# Patient Record
(demographics unavailable — no encounter records)

---

## 2024-11-15 NOTE — DISCUSSION/SUMMARY
[de-identified] : modify activities use elastic sleeve for structural support try OTC meds ice as needed try topical lidocaine for pain control reviewed current medications used by this patient home exercises for functional return  RE:  LUCY DELGADO   [id number]   Attention:  Nurse Reviewer /Medical Director    Based on my patient's condition, I strongly believe that the MRI L knee is medically.necessary.   The patient has failed oral meds, injections and PT and conservative treatment in combination or by themselves and therefore needs the MRI.   The MRI will dictate further treatment t recommendations.

## 2024-11-15 NOTE — HISTORY OF PRESENT ILLNESS
[Gradual] : gradual [Radiating] : radiating [Work] : work [Standing] : standing [Walking] : walking [Not working due to injury] : Work status: not working due to injury [de-identified] : no injury, happened for 3 days and no swelling, uses OTCs and volt gel without much help, pain worse with certain motions, happened after walking, no prior issues [] : no [FreeTextEntry1] : l knee [FreeTextEntry5] : knee pain started 11-12-24 and noticed knee and hip pain , no injury  [FreeTextEntry7] : up to the hip [de-identified] : xr at the [facility]

## 2024-11-15 NOTE — PHYSICAL EXAM
[5___] : quadriceps 5[unfilled]/5 [Positive] : positive Imelda [] : antalgic [Left] : left knee [AP] : anteroposterior [Lateral] : lateral [There are no fractures, subluxations or dislocations. No significant abnormalities are seen] : There are no fractures, subluxations or dislocations. No significant abnormalities are seen [FreeTextEntry9] : ossicles by TT [TWNoteComboBox7] : flexion 130 degrees

## 2024-11-18 NOTE — HISTORY OF PRESENT ILLNESS
[7] : 7 [2] : 2 [Dull/Aching] : dull/aching [Sharp] : sharp [Intermittent] : intermittent [Leisure] : leisure [Rest] : rest [Meds] : meds [Standing] : standing [Walking] : walking [Stairs] : stairs [de-identified] : happened for 6 days and no swelling, uses OTCs and volt gel without much help, pain worse with certain motions, happened after walking,had MRI [Gradual] : gradual [Radiating] : radiating [Work] : work [Not working due to injury] : Work status: not working due to injury [] : no [FreeTextEntry1] : l knee [FreeTextEntry5] : knee pain started 11-12-24 and noticed knee and hip pain , no injury  [FreeTextEntry7] : up to the hip [FreeTextEntry9] : Topical Creams/ Brace [de-identified] : Dr. Tyler [de-identified] : xr at the [facility]

## 2024-11-18 NOTE — DISCUSSION/SUMMARY
[de-identified] : Patient allowed to gently start resuming activities. Discussed change to medication prescription and usage. Bracing options discussed with patient for stability and support. Activity modification as needed Discussed poss future surgery, pt deciding, questions answered, no guarantees AS R knee MM no guarantees try topical lidocaine for pain control reviewed current medications used by this patient Home exercises for functional return

## 2024-11-18 NOTE — PHYSICAL EXAM
[Left] : left knee [5___] : quadriceps 5[unfilled]/5 [] : negative Lachmann [Equivocal] : equivocal Imelda [FreeTextEntry8] : mild [TWNoteComboBox7] : flexion 130 degrees negative...

## 2024-11-18 NOTE — DATA REVIEWED
[MRI] : MRI [Left] : left [Knee] : knee [Report was reviewed and noted in the chart] : The report was reviewed and noted in the chart [FreeTextEntry1] : complex tear PHMM< chondral loss medial compartment, medial femoral trochlea, moderate effusion.

## 2025-01-22 NOTE — PHYSICAL EXAM
[Left] : left knee [5___] : quadriceps 5[unfilled]/5 [Negative] : negative Neri's [] : negative Lachmann [FreeTextEntry8] : mild [TWNoteComboBox7] : flexion 125 degrees

## 2025-01-22 NOTE — DISCUSSION/SUMMARY
[de-identified] : 01/22/2025    RE:  LUCY DELGADO   Acct #- [id number]    Attention:  Nurse Reviewer /Medical Director  I am writing this letter as a medical necessity for PT program. Patient has tried analgesics, non-steroid anti-inflammatory agents,  hot or cold compresses,injections of corticosteroids, etc)  which in combination or by themselves has not worked. Based on my patient's condition, I strongly believe that the PT is medically needed.   Thank you for your time and consideration.

## 2025-01-22 NOTE — HISTORY OF PRESENT ILLNESS
[2] : 2 [Dull/Aching] : dull/aching [Sharp] : sharp [Leisure] : leisure [Sleep] : sleep [Rest] : rest [Standing] : standing [Walking] : walking [Stairs] : stairs [Lying in bed] : lying in bed [Gradual] : gradual [7] : 7 [Radiating] : radiating [Intermittent] : intermittent [Work] : work [Meds] : meds [Not working due to injury] : Work status: not working due to injury [de-identified] : one week after L knee  arthroscopy and med meniscectomy with grade 2 changes, no fevers nor chills [] : no [FreeTextEntry1] : l knee [FreeTextEntry5] : knee pain started 11-12-24 and noticed knee and hip pain , no injury  [FreeTextEntry7] : up to the hip [FreeTextEntry9] : Topical Creams/ Brace [de-identified] : Dr. Tyler [de-identified] : 1/14/25 [de-identified] : xr at the [facility]  [de-identified] : 01/14/25 [de-identified] : Left Knee Arthroscopy (Arthroscopic medial meniscectomy and lateral meniscectomy, Partial synovectomy)

## 2025-02-26 NOTE — HISTORY OF PRESENT ILLNESS
[Gradual] : gradual [Dull/Aching] : dull/aching [Radiating] : radiating [Sharp] : sharp [Intermittent] : intermittent [Leisure] : leisure [Sleep] : sleep [Rest] : rest [Meds] : meds [Physical therapy] : physical therapy [Standing] : standing [Walking] : walking [Stairs] : stairs [Lying in bed] : lying in bed [Not working due to injury] : Work status: not working due to injury [de-identified] : 6 weeks after L knee  arthroscopy and med meniscectomy with grade 2 changes,  has calf swelling, less sore but was sore after drive to [place] [7] : 7 [2] : 2 [Work] : work [] : no [FreeTextEntry1] : l knee [FreeTextEntry5] : knee pain started 11-12-24 and noticed knee and hip pain , no injury  [FreeTextEntry7] : up to the hip [FreeTextEntry9] : Topical Creams/ Brace [de-identified] : Dr. Tyler [de-identified] : 1/14/25 [de-identified] : xr at the [facility]  [de-identified] : 01/14/25 [de-identified] : Left Knee Arthroscopy (Arthroscopic medial meniscectomy and lateral meniscectomy, Partial synovectomy)

## 2025-02-26 NOTE — DISCUSSION/SUMMARY
[de-identified] : 2/26/2025    RE:  LUCY DELGADO   Acct #- [id number]    Attention:  Nurse Reviewer /Medical Director  I am writing this letter as a medical necessity for PT program. Patient has tried analgesics, non-steroid anti-inflammatory agents,  hot or cold compresses,injections of corticosteroids, etc)  which in combination or by themselves has not worked. Based on my patient's condition, I strongly believe that the PT is medically needed.   Thank you for your time and consideration.

## 2025-02-26 NOTE — PHYSICAL EXAM
[Left] : left knee [5___] : quadriceps 5[unfilled]/5 [Negative] : negative Neri's [] : mildly antalgic [FreeTextEntry8] : mild calf medial; [TWNoteComboBox7] : flexion 130 degrees